# Patient Record
Sex: FEMALE | Race: WHITE | Employment: FULL TIME | URBAN - METROPOLITAN AREA
[De-identification: names, ages, dates, MRNs, and addresses within clinical notes are randomized per-mention and may not be internally consistent; named-entity substitution may affect disease eponyms.]

---

## 2017-09-19 ENCOUNTER — HOSPITAL ENCOUNTER (OUTPATIENT)
Dept: MAMMOGRAPHY | Age: 47
Discharge: HOME | End: 2017-09-19
Attending: OBSTETRICS & GYNECOLOGY | Admitting: OBSTETRICS & GYNECOLOGY

## 2017-09-19 DIAGNOSIS — Z12.31 VISIT FOR SCREENING MAMMOGRAM: ICD-10-CM

## 2017-11-22 ENCOUNTER — OFFICE VISIT (OUTPATIENT)
Dept: ORTHOPEDIC SURGERY | Age: 47
End: 2017-11-22

## 2017-11-22 VITALS — BODY MASS INDEX: 23.54 KG/M2 | HEIGHT: 67 IN | WEIGHT: 150 LBS

## 2017-11-22 DIAGNOSIS — M54.5 RIGHT LOW BACK PAIN, UNSPECIFIED CHRONICITY, WITH SCIATICA PRESENCE UNSPECIFIED: Primary | ICD-10-CM

## 2017-11-22 DIAGNOSIS — M51.36 LUMBAR DEGENERATIVE DISC DISEASE: ICD-10-CM

## 2017-11-22 PROCEDURE — 99213 OFFICE O/P EST LOW 20 MIN: CPT | Performed by: ORTHOPAEDIC SURGERY

## 2017-11-22 PROCEDURE — 72100 X-RAY EXAM L-S SPINE 2/3 VWS: CPT | Performed by: ORTHOPAEDIC SURGERY

## 2017-11-22 NOTE — PROGRESS NOTES
Chief Complaint    Lower Back Pain (lumbar pain)      History of Present Illness:  Beronica Garza is a 49 y.o. female. She is here for evaluation of her lumbar spine. She's had low back pain that is been going on now for roughly 7 or 8 months. She states earlier this spring she started having low back pain while working out and this has been getting progressively worse. It is more within the right lower lumbar area. She does get radicular pain into her gluteal region. She denies radicular pain or weight on the leg and denies numbness or tingling. It is worse with activity. It has not improved despite modification of activities as well as core strengthening exercises and anti-inflammatories. She has also worked with a chiropractor extensively without improvement. Medical History:  Patient's medications, allergies, past medical, surgical, social and family histories were reviewed and updated as appropriate. Review of Systems:  Pertinent items are noted in HPI  Review of systems reviewed from Patient History Form dated on 11/22/17 and available in the patient's chart under the Media tab. Vital Signs:  Ht 5' 7.01\" (1.702 m)   Wt 150 lb (68 kg)   BMI 23.49 kg/m²     General Exam:   Constitutional: Patient is adequately groomed with no evidence of malnutrition  DTRs: Deep tendon reflexes are intact  Mental Status: The patient is oriented to time, place and person. The patient's mood and affect are appropriate. Lumbar/Sacral Spine Examination:    Inspection:  No sniff can swelling erythema noted about the lumbar spine today    Palpation:  Palpable spasm within the lumbar spine. No tenderness along the trochanteric bursa bilaterally. Range of Motion:  She does have some limitation of her motion with lumbar extension flexion    Strength:  Her strength is 5 out of 5 throughout her bilateral lower extremities    Special Tests:  Negative straight leg raise exam bilaterally.   She does have good passive range of motion of the bilateral hips. Negative Stinchfield exam    Skin: There are no rashes, ulcerations or lesions. Gait: She is walking with a normal gait      Additional Comments:       Additional Examinations:         Thoracic Spine: Examination of the thoracic spine does not show any tenderness, deformity or injury. Range of motion is unremarkable. There is no gross instability. There are no rashes, ulcerations or lesions. Strength and tone are normal.    Radiology:     X-rays obtained and reviewed in office:  Views 3 views of lumbar spine done traits no evidence fracture dislocation. There is degenerative disc changes at L3 4 and especially L4 5 level with disc space loss and osteophyte formation. No fracture dislocation        Assessment :  Lumbar degenerative disc disease with lumbar radiculopathy    Impression:  Encounter Diagnoses   Name Primary?  Right low back pain, unspecified chronicity, with sciatica presence unspecified Yes    Lumbar degenerative disc disease        Office Procedures:  Orders Placed This Encounter   Procedures    XR LUMBAR SPINE (2-3 VIEWS)    MRI lumbar spine without contrast     MRI lumbar spine R/O lumbar radiculopathy M54.16  [place] Thursday 11/30/17 3:15pm arrival time 3:30pm scan time       Treatment Plan:  I discussed the diagnosis and prognosis with her today. She has not had improvement despite 6 months of modifying her activity as well as physical therapy exercises with core strengthening as well as chiropractic work and anti-inflammatories. I would recommend an MRI of the lumbar spine at this point in time. We will see her back once the MRI is completed to go over results and come up plan for treatment.

## 2017-12-05 DIAGNOSIS — M51.36 DEGENERATION OF LUMBAR INTERVERTEBRAL DISC: Primary | ICD-10-CM

## 2017-12-05 DIAGNOSIS — M54.5 LOW BACK PAIN, UNSPECIFIED BACK PAIN LATERALITY, UNSPECIFIED CHRONICITY, WITH SCIATICA PRESENCE UNSPECIFIED: ICD-10-CM

## 2017-12-13 ENCOUNTER — OFFICE VISIT (OUTPATIENT)
Dept: ORTHOPEDIC SURGERY | Age: 47
End: 2017-12-13

## 2017-12-13 VITALS
SYSTOLIC BLOOD PRESSURE: 123 MMHG | HEIGHT: 67 IN | WEIGHT: 150 LBS | BODY MASS INDEX: 23.54 KG/M2 | DIASTOLIC BLOOD PRESSURE: 70 MMHG

## 2017-12-13 DIAGNOSIS — M51.26 LUMBAR DISC HERNIATION: Primary | ICD-10-CM

## 2017-12-13 DIAGNOSIS — M51.36 DDD (DEGENERATIVE DISC DISEASE), LUMBAR: ICD-10-CM

## 2017-12-13 PROCEDURE — 99243 OFF/OP CNSLTJ NEW/EST LOW 30: CPT | Performed by: PHYSICAL MEDICINE & REHABILITATION

## 2017-12-13 NOTE — LETTER
Please schedule the following with:     Date:       Account: [de-identified]  Patient: Homar Tipton    : 1970  Address:  [place] [address]    Phone (H):  [phone] (home) [phone] (work)     ----------------------------------------------------------------------------------------------  Diagnosis:     ICD-10-CM ICD-9-CM    1. Lumbar disc herniation M51.26 722.10 Amb External Referral To Physical Therapy   2. DDD (degenerative disc disease), lumbar M51.36 722.52 Amb External Referral To Physical Therapy         Levels: L4/L5  midline  Interlaminar MEL    ----------------------------------------------------------------------------------------------  Injection #   [id number]    Attending Physician       Bridger Reynaga.  Jules Blank MD.      ----------------------------------------------------------------------------------------------  Injection Scheduled For:    At:    1st Insurance:     Pre-Cert#    2nd Insurance:    Pre-Cert#    Comments:    · Infection control  · Tested positive for MRSA in past 12 months:  no  · Tested positive for MSSA \"staph infection\" in past 12 months: no  · Tested positive for VRE (Vancomycin Resistant Enterococci) in past 12 months:   no  · Currently on any antibiotics for an infection: no  · Anticoagulants:  · On a blood thinner:  no   · Any history of bleeding disorder: no   · Advanced Liver disease: no   · Advanced Renal disease: no   · Glaucoma: no   · Diabetes: no     Sedation:  Yes  -----------------------------------------------------------------------------------------------  Allergies   Allergen Reactions    Ciprofloxacin Hives

## 2017-12-13 NOTE — PROGRESS NOTES
New Patient: SPINE    Referring Provider:  Kane Blum MD    CHIEF COMPLAINT:    Chief Complaint   Patient presents with    Lower Back Pain     pain x9 months after competing in [employer], increased over the last 3 months, worsens with specific exercises       HISTORY OF PRESENT ILLNESS:      · The patient is being sent at the request of Kane Blum MD in consultation as a new spine patient for low back pain. The patient is a 49 y.o. female whom reports she has had low back pain since being in [place] [address]. She developed intermittent low back pain which is aggravated by minor activities. No leg pain. She has tried to stop activities but this has not helped. Advil helps. She had a lumbar xray and MR undertaken after seeing Dr Nelly Chapa. She sees a chiropractor and massage therapist monthly to every other week. This also did not help as much.      Pain Assessment  Location of Pain: Back  Location Modifiers: Posterior  Severity of Pain: 6  Quality of Pain: Aching  Duration of Pain: Persistent  Frequency of Pain: Constant  Aggravating Factors: Exercise, Bending  Limiting Behavior: Yes  Relieving Factors: Rest  Result of Injury: No  Work-Related Injury: No  Are there other pain locations you wish to document?: No      Associated signs and symptoms:   Neurogenic bowel or bladder symptoms:  no   Perceived weakness:  no   Difficulty walking:  no    Recent Imaging (within past one year)   Xrays: yes   MRI or CT of spine: yes    Current/Past Treatment:   · Physical Therapy:  none  · Chiropractic:  yes  · Injection:  yes  · Medications:   NSAIDS:  yes   Muscle relaxer:  none   Steriods:  none   Neuropathic medications:  none   Opioids:  none  · Previous surgery:  no  · Previous surgical consult:  no                    Past Medical History:   Past Medical History:   Diagnosis Date    skin       Past Surgical History:     Past Surgical History:   Procedure Laterality Date    ABDOMEN SURGERY       EXAMINATION:  · Inspection: Local inspection shows no step-off or bruising. Lumbar alignment is normal. No instability is noted. · Palpation:   No evidence of tenderness at the midline. No tenderness bilaterally at the paraspinal or trochanters. There is no paraspinal spasm. · Range of Motion: limited by 25% in all planes due to pain  · Strength:   Strength testing is 5/5 in all muscle groups tested. · Special Tests:   Straight leg raise and crossed SLR negative. Ilia's testing is negative bilaterally. FADIR's testing is negative bilaterally. · Skin: There are no rashes, ulcerations or lesions. · Reflexes: Reflexes are symmetrically 2+ at the patellar and ankle tendons. Clonus absent bilaterally at the feet. · Gait & station: normal, patient ambulates without assistance  · Additional Examinations:  · RIGHT LOWER EXTREMITY: Inspection/examination of the right lower extremity does not show any tenderness, deformity or injury. Range of motion is unremarkable. There is no gross instability. There are no rashes, ulcerations or lesions. Strength and tone are normal. No atrophy or abnormal movements are noted. · LEFT LOWER EXTREMITY:  Inspection/examination of the left lower extremity does not show any tenderness, deformity or injury. Range of motion is unremarkable. There is no gross instability. There are no rashes, ulcerations or lesions. Strength and tone are normal. No atrophy or abnormal movements are noted.       Diagnostic Testing:    Xrays:   I personally reviewed images of the AP and lateral lumbar spine from 11/20/2017 showing mild L4 5 degenerative disc disease  MRI or CT:  MRI lumbar spine 11/30/2017 shows L4 5 disc protrusion, mild 3 for disc protrusion, motor changes are noted at the L4 5 level with high-grade stress reaction noted  EMG:  None  Results for orders placed or performed during the hospital encounter of 11/21/16   CBC   Result Value Ref Range    WBC 4.9 4.0 - 11.0 K/uL    RBC 4.53 Patient education material - Anatomic drawings, healthy lifestyle education, osteoporosis prevention, back and neck pain educational information, and advanced imaging preparedness were distributed to the patient. Posture education, proper lifting and carrying techniques, weight control, quitting smoking and minor ways to treat back pain were reviewed and distributed. For further information regarding the spine conditions and to review interventional treatments the patient was directed to [employer].  6.  Follow up:  4-6 weeks        Jessica Glez MD, NATE, [facility]  Board Certified in [address] [place] [address] Certified and Fellowship Trained in [facility] ([facility])     This dictation was performed with a verbal recognition program [facility]) and it was checked for errors. It is possible that there are still dictated errors within this office note. If so, please bring any errors to my attention for an addendum. All efforts were made to ensure that this office note is accurate.

## 2017-12-14 ENCOUNTER — TELEPHONE (OUTPATIENT)
Dept: ORTHOPEDIC SURGERY | Age: 47
End: 2017-12-14

## 2017-12-18 ENCOUNTER — HOSPITAL ENCOUNTER (OUTPATIENT)
Dept: PHYSICAL THERAPY | Age: 47
Discharge: HOME | End: 2017-12-31
Admitting: PHYSICAL MEDICINE & REHABILITATION

## 2017-12-18 NOTE — FLOWSHEET NOTE
[address] [facility], [facility] [address] [place] [address]  Phone: [phone]   Fax:     [phone]    Physical Therapy Daily Treatment Note  Date:  2017    Patient Name:  Campbell Hubbard    :  1970  MRN: [id number]  Restrictions/Precautions:    Medical/Treatment Diagnosis Information:  · Diagnosis: M51.26 (ICD-10-CM) - Lumbar disc herniation  · Treatment Diagnosis: Low back pain   Insurance/Certification information:  PT Insurance Information: [place]   Physician Information:  Referring Practitioner: Dr. Akil Riley of care signed (Y/N):     Date of Patient follow up with Physician: Epidural , 17    G-Code (if applicable):      Date G-Code Applied:  17  PT G-Codes  Functional Assessment Tool Used: Loli  Score: 17% deficit   Functional Limitation: Changing and maintaining body position  Changing and Maintaining Body Position Current Status ():  At least 1 percent but less than 20 percent impaired, limited or restricted  Changing and Maintaining Body Position Goal Status (): 0 percent impaired, limited or restricted    Progress Note:     Next due by: Visit #10      Latex Allergy:  [x]NO      []YES  Preferred Language for Healthcare:   [x]English       []other:    Visit # Insurance Allowable   1 45     Pain level:  3-8/10     SUBJECTIVE:  See eval    OBJECTIVE: See eval  Observation:   Test measurements:      RESTRICTIONS/PRECAUTIONS:     Exercises/Interventions:   ROM/stretches     SKTC 10x10\"     DKTC     Piriformis stretch 5x30\"     Seated HS 5x30\"     Pelvic tilt     Hook lying rotation     IT band stretch supine w/ strap 5x30\"          Strengthening     SLR     Quadruped alternate UE reaches     Quadruped alternate LE reaches     Quadruped alternate UE/LE reaches     Ball squats     Ball heel raises     Sit ups      planks     Tband lat pulls     Tband rows         Manual Intervention

## 2017-12-18 NOTE — PLAN OF CARE
[facility] [address] [place] [address]  Phone [phone]  Fax [phone]      Physical Therapy Certification    Dear Referring Practitioner: Dr. Jonathan Cooper ,    We had the pleasure of evaluating the following patient for physical therapy services at [address] [place] [address]. A summary of our findings can be found in the initial assessment below. This includes our plan of care. If you have any questions or concerns regarding these findings, please do not hesitate to contact me at the office phone number checked above. Thank you for the referral.       Physician Signature:_______________________________Date:__________________  By signing above (or electronic signature), therapists plan is approved by physician      Patient: Aminah Tee   : 1970   MRN: [id number]  Referring Physician: Referring Practitioner: Dr. Jonathan Cooper       Evaluation Date: 2017      Medical Diagnosis Information:  Diagnosis: M51.26 (ICD-10-CM) - Lumbar disc herniation   Treatment Diagnosis: Low back pain                                          Insurance information: PT Insurance Information: [employer]      Precautions/ Contra-indications:   Latex Allergy:  [x]NO      []YES  Preferred Language for Healthcare:   [x]English       []other:    SUBJECTIVE: Patient stated complaint: Pt reports she has been having pain in her low back since 2016 and it has progressively gotten worse over the last 3 months. Cross fit activities - back squat and heavy deadlifts make pain worse. Massage therapy does help alleviate pain. She does continue to do Crossfit 4x per week. She has scheduled an epidural injection next week.     Imaging per MD note:   Xrays:  Mild L4 5 degenerative disc disease  MRI or CT:  MRI lumbar spine 2017 shows L4 5 disc protrusion, mild 3 for disc protrusion, motor changes are noted at the L4 5 level with high-grade stress reaction noted  EMG:  None    Relevant Medical History: high blood pressure   Functional Disability Index/G-Codes:  PT G-Codes  Functional Assessment Tool Used: Loli  Score: 17% deficit   Functional Limitation: Changing and maintaining body position  Changing and Maintaining Body Position Current Status ():  At least 1 percent but less than 20 percent impaired, limited or restricted  Changing and Maintaining Body Position Goal Status (): 0 percent impaired, limited or restricted    Pain Scale: 3-8/10  Easing factors: avoiding certain activities, heated seats, massage    Provocative factors: prolonged sitting, bending over, back squats, dead lifts      Type: []Constant   [x]Intermittent  []Radiating []Localized []other:     Numbness/Tingling: denies any neurological symptoms      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    ROM  Comments   Trunk flexion To floor  Pain    Trunk extension WNL  Makes pain worse; does not like prone on elbows    Trunk R sidebend WNL Decreased C curve   Trunk L sidebend WNL Normal C curve    Trunk R rotation WNL    Trunk L rotation WNL     HS flexibility 80 deg  Passive SLR bilateral   Hip rotators  + mod limitation bilaterally      Ely  + mod    Sana + bilaterally max       Strength Left Right Comments   Hip flexion(L2) 5 5    Knee extension(L3) 5 5    Knee flexion(S1-2) 5 5    Ankle dorsiflexion(L4) 5 5    Toe extension(L5) 5 5    Ankle eversion/plantar flexion(S1) 5 5    Hip abduction  5 5      Special tests  Comments   SLR -    Slump test -    Pelvic symmetry  Normal in standing   Normal in seated     Segmental Spinal mobility Hypo     Heel walk WNL    Toe walk WNL     Prone knee flexion test  +     Stork  Normal bilat      DTRs Left Right Comments   Patellar(L3-L4) Diminished  Diminished     Achilles(S1-S2) Normal  Normal             Joint mobility:    []Normal intervention required. [] Falls Risk assessed and Patient requires intervention due to being higher risk   TUG score (>12s at risk):     [] Falls education provided, including       G-Codes:  PT G-Codes  Functional Assessment Tool Used: Loli  Score: 17% deficit   Functional Limitation: Changing and maintaining body position  Changing and Maintaining Body Position Current Status (): At least 1 percent but less than 20 percent impaired, limited or restricted  Changing and Maintaining Body Position Goal Status (): 0 percent impaired, limited or restricted    ASSESSMENT: Patient is a 49year old female who presents with decreased functional mobility and strength of core and lower extremities due to lumbar disc herniation. Expectations, POC and diagnosis was reviewed with patient. Patient reports understanding of all education provided and is in agreement with plan of care. Patient will benefit from skilled physical therapy to address outlined problems and goals.      Functional Impairments:     [x]Noted lumbar/proximal hip hypomobility   []Noted lumbosacral and/or generalized hypermobility   []Decreased Lumbosacral/hip/LE functional ROM   [x]Decreased core/proximal hip strength and neuromuscular control    [x]Decreased LE functional strength    []Abnormal reflexes/sensation/myotomal/dermatomal deficits  [x]Reduced balance/proprioceptive control    []other:      Functional Activity Limitations (from functional questionnaire and intake)   [x]Reduced ability to tolerate prolonged functional positions   []Reduced ability or difficulty with changes of positions or transfers between positions   [x]Reduced ability to maintain good posture and demonstrate good body mechanics with sitting, bending, and lifting   []Reduced ability to sleep   [] Reduced ability or tolerance with driving and/or computer work   []Reduced ability to perform lifting, reaching, carrying tasks   [x]Reduced ability to squat   [x]Reduced ability to forward bend   []Reduced ability to ambulate prolonged functional periods/distances/surfaces   []Reduced ability to ascend/descend stairs   []other:       Participation Restrictions   []Reduced participation in self care activities   []Reduced participation in home management activities   [x]Reduced participation in work activities   [x]Reduced participation in social activities. [x]Reduced participation in sport/recreational activities. Classification:   [x]Signs/symptoms consistent with Lumbar instability/stabilization subgroup. []Signs/symptoms consistent with Lumbar mobilization/manipulation subgroup, myotomes and dermatomes intact. Meets manipulation criteria. []Signs/symptoms consistent with Lumbar direction specific/centralization subgroup   []Signs/symptoms consistent with Lumbar traction subgroup     []Signs/symptoms consistent with lumbar facet dysfunction   []Signs/symptoms consistent with lumbar stenosis type dysfunction   []Signs/symptoms consistent with nerve root involvement including myotome & dermatome dysfunction   []Signs/symptoms consistent with post-surgical status including: decreased ROM, strength and function.    []signs/symptoms consistent with pathology which may benefit from Dry needling     []other:      Prognosis/Rehab Potential:      []Excellent   [x]Good    []Fair   []Poor    Tolerance of evaluation/treatment:    []Excellent   [x]Good    []Fair   []Poor    Physical Therapy Evaluation Complexity Justification  [x] A history of present problem with:  [x] no personal factors and/or comorbidities that impact the plan of care;  []1-2 personal factors and/or comorbidities that impact the plan of care  []3 personal factors and/or comorbidities that impact the plan of care  [x] An examination of body systems using standardized tests and measures addressing any of the following: body structures and functions (impairments), activity limitations, and/or participation restrictions;:  [x] a total of 1-2 or more elements   [] a total of 3 or more elements   [] a total of 4 or more elements   [x] A clinical presentation with:  [x] stable and/or uncomplicated characteristics   [] evolving clinical presentation with changing characteristics  [] unstable and unpredictable characteristics;   [x] Clinical decision making of [x] low, [] moderate, [] high complexity using standardized patient assessment instrument and/or measurable assessment of functional outcome. [x] EVAL (LOW) 95604 (typically 20 minutes face-to-face)  [] EVAL (MOD) 32710 (typically 30 minutes face-to-face)  [] EVAL (HIGH) 75409 (typically 45 minutes face-to-face)  [] RE-EVAL         PLAN:     Frequency/Duration:  1-2 days per week for 4 Weeks:  Interventions:  [x]  Therapeutic exercise including: strength training, ROM, for LE, Glutes and core   [x]  NMR activation and proprioception for glutes , LE and Core   [x]  Manual therapy as indicated for Hip complex, LE and spine to include: Dry Needling/IASTM, STM, PROM, Gr I-IV mobilizations, manipulation. [x]  Modalities as needed that may include: thermal agents, E-stim, Biofeedback, US, iontophoresis as indicated  [x]  Patient education on joint protection, postural re-education, activity modification, progression of HEP. HEP instruction: Provided written and verbal instructions (see scanned forms)    GOALS:  Patient stated goal: Get rid of pain     Therapist goals for Patient:   Short Term Goals: To be achieved in: 2 weeks  1. Independent in HEP and progression per patient tolerance, in order to prevent re-injury. 2. Patient will have a decrease in pain to facilitate improvement in movement, function, and ADLs as indicated by Functional Deficits. Long Term Goals: To be achieved in: 6-8 weeks  1. Disability index score of 5% or less for the [place] to assist with reaching prior level of function.    2. Patient will demonstrate increased AROM to WNL, good LS

## 2017-12-20 ENCOUNTER — HOSPITAL ENCOUNTER (OUTPATIENT)
Dept: PHYSICAL THERAPY | Age: 47
Discharge: HOME | End: 2017-12-20
Admitting: PHYSICAL MEDICINE & REHABILITATION

## 2017-12-20 NOTE — FLOWSHEET NOTE
[address], [address] [place] [address], [address] [place] [address]  Phone: [phone]   Fax:     [phone]    Physical Therapy Daily Treatment Note  Date:  2017    Patient Name:  Helen Anglin    :  1970  MRN: [id number]  Restrictions/Precautions:    Medical/Treatment Diagnosis Information:  · Diagnosis: M51.26 (ICD-10-CM) - Lumbar disc herniation  · Treatment Diagnosis: Low back pain   Insurance/Certification information:  PT Insurance Information: [place]   Physician Information:  Referring Practitioner: Dr. Rachel Barrientos of care signed (Y/N):     Date of Patient follow up with Physician: Epidural , 17    G-Code (if applicable):      Date G-Code Applied:  17       Progress Note:     Next due by: Visit #10      Latex Allergy:  [x]NO      []YES  Preferred Language for Healthcare:   [x]English       []other:    Visit # Insurance Allowable   2   45     Pain level:  3-8/10     SUBJECTIVE: Pt reports she had increased soreness in her left hip after her first session.       OBJECTIVE: See eval  Observation:   Test measurements:      RESTRICTIONS/PRECAUTIONS:     Exercises/Interventions:   ROM/stretches     SKTC 10x10\"     DKTC     Piriformis stretch 5x30\"     Seated HS 5x30\"     Ab bracing - hook lying  10x10\"  Added 12/20   Hook lying rotation     IT band stretch supine w/ strap 5x30\"          Strengthening     Heel taps - supine @ 90/90  3x10  Added 12/20   Palloff press 2x10 each way green  Added 12/20        Quadruped alternate UE/LE reaches 2x10 green  Added 12/20   Ball squats     Ball heel raises     Sit ups      planks     Tband lat pulls     Tband rows         Manual Intervention             Prone PA      GISTM/STM      Lumbar Manip      SI Manip      Hip belt mobs      Hip LA distraction              Therapeutic Exercise and NMR EXR  [x] (81553) Provided verbal/tactile cueing for activities related to table during quadruped exercise. 12/20    Treatment/Activity Tolerance:  [x] Patient tolerated treatment well [] Patient limited by fatique  [] Patient limited by pain  [] Patient limited by other medical complications  [] Other:     Patient education:  12/18: Patient education on PT and plan of care including diagnosis, prognosis, treatment goals and options. Patient agrees with discussed POC and treatment and is aware of rehab process. Pt was also educated on clinic layout and use of modalities. Prognosis: [x] Good [] Fair  [] Poor    Patient Requires Follow-up: [x] Yes  [] No    PLAN: 1-2x per week for 4 weeks.  12/18/17-1/18/18  [x] Continue per plan of care [] Alter current plan (see comments)  [] Plan of care initiated [] Hold pending MD visit [] Discharge    Electronically signed by: Miki Brito PT, DPT

## 2017-12-27 ENCOUNTER — HOSPITAL ENCOUNTER (OUTPATIENT)
Dept: PAIN MANAGEMENT | Age: 47
Discharge: HOME | End: 2017-12-27
Attending: PHYSICAL MEDICINE & REHABILITATION | Admitting: PHYSICAL MEDICINE & REHABILITATION

## 2017-12-27 VITALS
DIASTOLIC BLOOD PRESSURE: 76 MMHG | OXYGEN SATURATION: 100 % | RESPIRATION RATE: 18 BRPM | WEIGHT: 150 LBS | SYSTOLIC BLOOD PRESSURE: 128 MMHG | BODY MASS INDEX: 23.54 KG/M2 | HEIGHT: 67 IN | HEART RATE: 60 BPM | TEMPERATURE: 97.7 F

## 2017-12-27 LAB — HCG(URINE) PREGNANCY TEST: NEGATIVE

## 2017-12-27 RX ORDER — IBUPROFEN 600 MG/1
600 TABLET ORAL EVERY 6 HOURS PRN
COMMUNITY

## 2017-12-27 ASSESSMENT — PAIN - FUNCTIONAL ASSESSMENT
PAIN_FUNCTIONAL_ASSESSMENT: 0-10
PAIN_FUNCTIONAL_ASSESSMENT: 0-10

## 2017-12-27 ASSESSMENT — PAIN DESCRIPTION - DESCRIPTORS: DESCRIPTORS: ACHING

## 2017-12-27 NOTE — PROGRESS NOTES
Pain Therapy Intra-procedural Note CAUDAL interlaminar     Position: Prone   Site marked/ confirmed: Yes   Prepped with chloraprep      Local :Lidocaine 1%      Depomedrol: 80mg /ml    Saline 0.9%       Monitoring nurse Kavin Arvizu RN              Circulator: Jossy Mckeon RN   C - Arm Operated by: Fadia Lima RT              Prepped by: Neal Andres MD

## 2017-12-27 NOTE — OP NOTE
Patient:  Alanna Bajwa  YOB: 1970  Medical Record #:  [id number]   Place:   [address] [place] [address]  Date:  12/27/2017   Physician:  Braulio Peña MD    Procedure:  Lumbar Epidural Steroid Injection - Interlaminar approach  L4 - L5    Pre-Procedure Diagnosis: Lumbar HNP, Lumbar DDD    Post-Procedure Diagnosis: Same    Sedation: Local with 1% Lidocaine 3 ml and 2 mg of IV Versed    EBL: None    Complications: None    Procedure Summary:  The patient was seen in the office for complaints of low back pain. Review of the imaging and physical exam of the patient confirmed the pre-procedure diagnosis. After a thorough discussion of risks, benefits and alternatives informed consent was obtained. The patient was brought to the procedure suite and placed in the prone position. The skin overlying the lumbar spine was prepped and draped in the usual sterile fashion. Using fluoroscopic guidance, the L4 - L5 interlaminar space was identified. Through anesthetized skin a 20 gauge Touhy needle was advanced into the epidural space using continuous loss of resistance to saline technique. Isovue M300 was instilled and an epidurogram was noted without evidence of intrathecal or vascular spread. 10 ml of a solution containing preservative free normal saline and 80 mg of Depomedrol was instilled. The needle was removed and a band-aid applied. The patient was transferred to the post-operative area in stable condition.

## 2017-12-29 ENCOUNTER — HOSPITAL ENCOUNTER (OUTPATIENT)
Dept: PHYSICAL THERAPY | Age: 47
Discharge: HOME | End: 2017-12-29
Admitting: PHYSICAL MEDICINE & REHABILITATION

## 2017-12-29 NOTE — FLOWSHEET NOTE
high level activities including burpees at this time. 12/29    Treatment/Activity Tolerance:  [x] Patient tolerated treatment well [] Patient limited by fatique  [] Patient limited by pain  [] Patient limited by other medical complications  [] Other:     Patient education:  12/18: Patient education on PT and plan of care including diagnosis, prognosis, treatment goals and options. Patient agrees with discussed POC and treatment and is aware of rehab process. Pt was also educated on clinic layout and use of modalities. 12/29: Discussed with pt she should not be doing high level activities this early after an injection       Prognosis: [x] Good [] Fair  [] Poor    Patient Requires Follow-up: [x] Yes  [] No    PLAN: 1-2x per week for 4 weeks.  12/18/17-1/18/18  [x] Continue per plan of care [] Alter current plan (see comments)  [] Plan of care initiated [] Hold pending MD visit [] Discharge    Electronically signed by: Hallie Medrano PT, DPT

## 2018-01-01 ENCOUNTER — HOSPITAL ENCOUNTER (OUTPATIENT)
Dept: PHYSICAL THERAPY | Age: 48
Discharge: HOME | End: 2018-01-31
Attending: PHYSICAL MEDICINE & REHABILITATION | Admitting: PHYSICAL MEDICINE & REHABILITATION

## 2018-01-10 ENCOUNTER — HOSPITAL ENCOUNTER (OUTPATIENT)
Dept: PHYSICAL THERAPY | Age: 48
Discharge: HOME | End: 2018-01-10
Admitting: PHYSICAL MEDICINE & REHABILITATION

## 2018-01-10 NOTE — FLOWSHEET NOTE
[facility], [address] [place] [address], [address] [place] [address]  Phone: [phone]   Fax:     [phone]    Physical Therapy Daily Treatment Note  Date:  1/10/2018    Patient Name:  Hermelinda Pearce    :  1970  MRN: [id number]  Restrictions/Precautions:    Medical/Treatment Diagnosis Information:  · Diagnosis: M51.26 (ICD-10-CM) - Lumbar disc herniation  · Treatment Diagnosis: Low back pain   Insurance/Certification information:  PT Insurance Information: [place]   Physician Information:  Referring Practitioner: Dr. Olvera Share of care signed (Y/N):     Date of Patient follow up with Physician: Epidural , 17    G-Code (if applicable):      Date G-Code Applied:  17       Progress Note:     Next due by: Visit #10      Latex Allergy:  [x]NO      []YES  Preferred Language for Healthcare:   [x]English       []other:    Visit # Insurance Allowable   3 in 2017      1  1/10 45     Pain level:  1/10 1/10    SUBJECTIVE: Pt reports she does not have time to be here today and would only like to be given new stretches.   She feels that she is fine on progressing core on her own.  1/10    OBJECTIVE: See eval  Observation:   Test measurements:      RESTRICTIONS/PRECAUTIONS:     Exercises/Interventions:   ROM/stretches     SKTC DKTC Piriformis stretch Seated HS Ab bracing - hook lying  Hook lying rotation IT band stretch supine w/ strap Prone quad stretch  5x30\"  Added 1/10   Prayer stretch  3x30\" each direction Added 1/10             Strengthening     Sit ups      planks     Tband lat pulls     Tband rows         Manual Intervention             Prone PA      GISTM/STM      Lumbar Manip      SI Manip      Hip belt mobs      Hip LA distraction              Therapeutic Exercise and NMR EXR  [x] (85631) Provided verbal/tactile cueing for activities related to strengthening, flexibility, endurance, ROM  for improvements in proximal hip and core control with self care, mobility, lifting and ambulation. [x] (41906) Provided verbal/tactile cueing for activities related to improving balance, coordination, kinesthetic sense, posture, motor skill, proprioception  to assist with core control in self care, mobility, lifting, and ambulation. Therapeutic Activities:    [] (34356 or 04234) Provided verbal/tactile cueing for activities related to improving balance, coordination, kinesthetic sense, posture, motor skill, proprioception and motor activation to allow for proper function  with self care and ADLs  [] (68471) Provided training and instruction to the patient for proper core and proximal hip recruitment and positioning with ambulation re-education     Home Exercise Program:    [x] (58622) Reviewed/Progressed HEP activities related to strengthening, flexibility, endurance, ROM of core, proximal hip and LE for functional self-care, mobility, lifting and ambulation   [] (97978) Reviewed/Progressed HEP activities related to improving balance, coordination, kinesthetic sense, posture, motor skill, proprioception of core, proximal hip and LE for self care, mobility, lifting, and ambulation      Manual Treatments:  PROM / STM / Oscillations-Mobs:  G-I, II, III, IV (PA's, Inf., Post.)  [] (44685) Provided manual therapy to mobilize proximal hip and LS spine soft tissue/joints for the purpose of modulating pain, promoting relaxation,  increasing ROM, reducing/eliminating soft tissue swelling/inflammation/restriction, improving soft tissue extensibility and allowing for proper ROM for normal function with self care, mobility, lifting and ambulation.      Modalities:       Charges:  Timed Code Treatment Minutes: 15  TE    Total Treatment Minutes: 252-382       [] EVAL (LOW) 31508 (typically 20 minutes face-to-face)  [] EVAL (MOD) 77962 (typically 30 minutes face-to-face)  [] EVAL (HIGH) [id number] (typically 45 minutes face-to-face)  [] RE-EVAL     [x] EL(94820)

## 2018-01-24 ENCOUNTER — OFFICE VISIT (OUTPATIENT)
Dept: ORTHOPEDIC SURGERY | Age: 48
End: 2018-01-24

## 2018-01-24 VITALS
SYSTOLIC BLOOD PRESSURE: 125 MMHG | DIASTOLIC BLOOD PRESSURE: 80 MMHG | BODY MASS INDEX: 23.53 KG/M2 | WEIGHT: 149.91 LBS | HEIGHT: 67 IN

## 2018-01-24 DIAGNOSIS — M51.26 LUMBAR DISC HERNIATION: Primary | ICD-10-CM

## 2018-01-24 DIAGNOSIS — M47.816 SPONDYLOSIS OF LUMBAR REGION WITHOUT MYELOPATHY OR RADICULOPATHY: ICD-10-CM

## 2018-01-24 PROCEDURE — 99213 OFFICE O/P EST LOW 20 MIN: CPT | Performed by: PHYSICAL MEDICINE & REHABILITATION

## 2018-01-24 NOTE — PROGRESS NOTES
GI Precautions including to stop if develop dark tarry stools or GI upset and to take with food. 2. PT:  Encouraged to continue with HEP. 3. Further studies:  No further studies. 4. Interventional:  Consider right L4 TF in 6 weeks if no benefit from further nonsurgical treatments  5. Follow up:  4-6 weeks          Jessica Robbins MD, NATE, [facility]  Board Certified in [address] [place] [address]  Certified and Fellowship Trained in [facility] [address]             This dictation was performed with a verbal recognition program [facility]) and it was checked for errors. It is possible that there are still dictated errors within this office note. If so, please bring any errors to my attention for an addendum. All efforts were made to ensure that this office note is accurate.

## 2018-02-01 ENCOUNTER — HOSPITAL ENCOUNTER (OUTPATIENT)
Dept: PHYSICAL THERAPY | Age: 48
Discharge: HOME | End: 2018-02-28
Attending: PHYSICAL MEDICINE & REHABILITATION | Admitting: PHYSICAL MEDICINE & REHABILITATION

## 2018-04-19 ENCOUNTER — TELEPHONE (OUTPATIENT)
Dept: ORTHOPEDIC SURGERY | Age: 48
End: 2018-04-19

## 2018-09-20 ENCOUNTER — HOSPITAL ENCOUNTER (OUTPATIENT)
Dept: MAMMOGRAPHY | Age: 48
Discharge: HOME | End: 2018-09-25
Payer: COMMERCIAL

## 2018-09-20 DIAGNOSIS — Z12.31 VISIT FOR SCREENING MAMMOGRAM: ICD-10-CM

## 2018-09-20 PROCEDURE — 77067 SCR MAMMO BI INCL CAD: CPT

## 2019-09-30 ENCOUNTER — HOSPITAL ENCOUNTER (OUTPATIENT)
Dept: WOMENS IMAGING | Age: 49
Discharge: HOME | End: 2019-09-30
Payer: COMMERCIAL

## 2019-09-30 DIAGNOSIS — Z12.31 VISIT FOR SCREENING MAMMOGRAM: ICD-10-CM

## 2019-09-30 PROCEDURE — 77063 BREAST TOMOSYNTHESIS BI: CPT

## 2021-08-04 ENCOUNTER — HOSPITAL ENCOUNTER (OUTPATIENT)
Dept: WOMENS IMAGING | Age: 51
Discharge: HOME | End: 2021-08-04
Payer: COMMERCIAL

## 2021-08-04 DIAGNOSIS — Z12.31 VISIT FOR SCREENING MAMMOGRAM: ICD-10-CM

## 2021-08-04 PROCEDURE — 77063 BREAST TOMOSYNTHESIS BI: CPT

## 2022-04-04 RX ORDER — ALBUTEROL SULFATE 90 UG/1
AEROSOL, METERED RESPIRATORY (INHALATION)
Qty: 8.5 EACH | OUTPATIENT
Start: 2022-04-04

## 2022-06-17 ENCOUNTER — TELEPHONE (OUTPATIENT)
Dept: FAMILY MEDICINE CLINIC | Age: 52
End: 2022-06-17

## 2022-06-17 RX ORDER — FLUCONAZOLE 150 MG/1
150 TABLET ORAL ONCE
Qty: 1 TABLET | Refills: 0 | Status: SHIPPED | OUTPATIENT
Start: 2022-06-17 | End: 2022-06-17

## 2023-06-22 ENCOUNTER — HOSPITAL ENCOUNTER (OUTPATIENT)
Dept: WOMENS IMAGING | Age: 53
Discharge: HOME | End: 2023-06-22
Payer: COMMERCIAL

## 2023-06-22 DIAGNOSIS — Z12.31 VISIT FOR SCREENING MAMMOGRAM: ICD-10-CM

## 2023-06-22 PROCEDURE — 77063 BREAST TOMOSYNTHESIS BI: CPT

## 2024-07-11 ENCOUNTER — HOSPITAL ENCOUNTER (OUTPATIENT)
Dept: WOMENS IMAGING | Age: 54
Discharge: HOME | End: 2024-07-11
Payer: COMMERCIAL

## 2024-07-11 VITALS — HEIGHT: 67 IN | WEIGHT: 165 LBS | BODY MASS INDEX: 25.9 KG/M2

## 2024-07-11 DIAGNOSIS — Z12.31 SCREENING MAMMOGRAM FOR BREAST CANCER: ICD-10-CM

## 2024-07-11 PROCEDURE — 77063 BREAST TOMOSYNTHESIS BI: CPT

## 2025-07-17 ENCOUNTER — HOSPITAL ENCOUNTER (OUTPATIENT)
Dept: WOMENS IMAGING | Age: 55
Discharge: HOME | End: 2025-07-17
Payer: COMMERCIAL

## 2025-07-17 VITALS — WEIGHT: 170 LBS | HEIGHT: 67 IN | BODY MASS INDEX: 26.68 KG/M2

## 2025-07-17 DIAGNOSIS — Z12.31 SCREENING MAMMOGRAM FOR BREAST CANCER: ICD-10-CM

## 2025-07-17 PROCEDURE — 77063 BREAST TOMOSYNTHESIS BI: CPT
